# Patient Record
(demographics unavailable — no encounter records)

---

## 2024-11-22 NOTE — ASSESSMENT
[FreeTextEntry1] : Assessment:  TERESA PHIPPS is a 54-year-old M with a history of kidney stone. Remains stone free on US.   Plan:  -Follow up visit in 1 year with renal ultrasound -Continue with generalized stone prevention strategies as previously discussed (increase fluid intake to keep urine clear or very faint yellow, limit dietary salt intake, increase fruits and vegetables, limit high oxalate foods, maintain normal dietary calcium, and moderation of non-dairy animal protein)

## 2024-11-22 NOTE — HISTORY OF PRESENT ILLNESS
[FreeTextEntry1] : Name TERESA PHIPPS MRN [id number]  Dec 18 1969 ------------------------------------------------------------------------------------------------------------------------------------------- Date of First Visit: 2023 Referring Provider/PCP: Dr. Veronica Jauregui / Dr. Rafael Johns ------------------------------------------------------------------------------------------------------------------------------------------- CC: kidney stones  History of Present Illness: TERESA PHIPPS is a 53 year M who presents for evaluation of kidney stones. Patient of Dr. Jauregui. Has been having intermittent pain, relieved with fluid intake.  Imaging: CT scan from 2023 can be found in [facility]. Findings: Large stone in the right ureteropelvic junction (11 x 16mm) , heavily calcified, demonstrates no hydronephrosis. There is mild diffuse thickening of the wall of the right renal pelvis. This most likely represents edema. The remainder of the urinary system demonstrates no calculi. The prostate is mildly prominent. The abdomen and pelvis otherwise demonstrate no mass or lymph node enlargement. Images are available on the .  Previous urine cultures: none  Kidney Stone History: First-time stone former - Yes Concurrent asymptomatic stone(s) - Yes Previous stone surgeries - No Previous passed stones - No Comorbidities - non-contributory Family history of kidney stones - No Previous metabolic evaluation - No ------------------------------------------------------------------------------------------------------------------------------------------- Interval History (2023): TERESA PHIPPS presents s/p right PCNL on 2023 for a [id number] stone in the right UPJ. He tolerated the procedure well and was discharged home on the following day. Postoperative CT demonstrated: No residual right urolithiasis. Resolved hydronephrosis. Good position new ureteral stent.  Patient presents today for stent removal and postoperative follow-up. He feels well. Denies fever, chills, nausea, vomiting. Mild stent-related symptoms - urinary urgency and pain at tip of penis.  Procedure: Stent was removed cystoscopically using sterile technique. Stent was intact. Patient tolerated the procedure well. ------------------------------------------------------------------------------------------------------------------------------------------- Interval History (2023): Patient presents for 1-month follow up and renal ultrasound after right PCNL on 2023. Doing well, no complaints. Ultrasound performed in the office today demonstrates no evidence of hydronephrosis bilaterally. Dilated right renal pelvis. No stones. Right flank pain has resolved. Stone composition: 50% Calcium oxalate monohydrate. 40% Calcium oxalate, dihydrate. 10% Calcium phosphate (apatite) Stone culture: No growth Under-collected 24-hour urine analysis (completed 2023) - will repeat. ---------------------------------------------------------------------------------------------------------------------------------------------------------------- Interval History (2023): Patient presents for follow-up kidney stone surveillance visit with renal ultrasound. Doing well, no complaints. No recent stone-related events. Ultrasound performed in the office today demonstrates no evidence of stones or hydronephrosis bilaterally. Very mild right RP fullness, similar to prior imaging. ---------------------------------------------------------------------------------------------------------------------------------------------------------------- Interval History (2024): Patient presents for follow-up kidney stone surveillance visit with renal ultrasound. Doing well, no complaints. No recent stone-related events. Ultrasound performed in the office today demonstrates no evidence of stones with stable mild right RP fullness.